# Patient Record
Sex: MALE | Race: WHITE
[De-identification: names, ages, dates, MRNs, and addresses within clinical notes are randomized per-mention and may not be internally consistent; named-entity substitution may affect disease eponyms.]

---

## 2023-03-09 ENCOUNTER — HOSPITAL ENCOUNTER (OUTPATIENT)
Dept: HOSPITAL 65 - RAD | Age: 53
Discharge: HOME | End: 2023-03-09
Attending: NURSE PRACTITIONER
Payer: COMMERCIAL

## 2023-03-09 DIAGNOSIS — Z00.00: Primary | ICD-10-CM

## 2023-03-09 PROCEDURE — 70220 X-RAY EXAM OF SINUSES: CPT

## 2023-03-09 NOTE — DIREP
PROCEDURE:XRAY SINUSES PARANASAL<3 VWS

 

COMPARISON:None.

 

INDICATIONS:NEEDED FOR HISTORY AND PHYSICAL EVALUATION

 

TECHNIQUE: Three views of the paranasal sinuses were performed.

 

FINDINGS:

MAXILLARY:Normal.  No mucosal thickening or fluid level.  

ETHMOID:Normal.  No mucosal thickening or fluid level.  

FRONTAL:Normal.  No mucosal thickening or fluid level.  

SPHENOID:Normal.  No mucosal thickening or fluid level.  

OTHER:Negative.  

 

CONCLUSION:The paranasal sinuses are clear.

 

 

Dictated by: Trenton Escalante M.D. on 03/09/2023 at 02:28 PM     

Electronically Signed By: Trenton Escalante M.D. on 03/09/2023 at 02:28 PM